# Patient Record
Sex: FEMALE | Race: WHITE | Employment: FULL TIME | URBAN - METROPOLITAN AREA
[De-identification: names, ages, dates, MRNs, and addresses within clinical notes are randomized per-mention and may not be internally consistent; named-entity substitution may affect disease eponyms.]

---

## 2017-02-14 ENCOUNTER — TELEPHONE (OUTPATIENT)
Dept: FAMILY MEDICINE CLINIC | Age: 47
End: 2017-02-14

## 2017-02-20 ENCOUNTER — OFFICE VISIT (OUTPATIENT)
Dept: FAMILY MEDICINE CLINIC | Age: 47
End: 2017-02-20

## 2017-02-20 VITALS
HEART RATE: 78 BPM | DIASTOLIC BLOOD PRESSURE: 88 MMHG | WEIGHT: 158 LBS | HEIGHT: 65 IN | SYSTOLIC BLOOD PRESSURE: 138 MMHG | OXYGEN SATURATION: 100 % | TEMPERATURE: 98.5 F | RESPIRATION RATE: 16 BRPM | BODY MASS INDEX: 26.33 KG/M2

## 2017-02-20 DIAGNOSIS — J06.9 UPPER RESPIRATORY TRACT INFECTION, UNSPECIFIED TYPE: ICD-10-CM

## 2017-02-20 DIAGNOSIS — E66.3 OVERWEIGHT (BMI 25.0-29.9): ICD-10-CM

## 2017-02-20 DIAGNOSIS — I10 ESSENTIAL HYPERTENSION: Primary | ICD-10-CM

## 2017-02-20 RX ORDER — TOPIRAMATE 25 MG/1
25 TABLET ORAL
Qty: 30 TAB | Refills: 5 | Status: SHIPPED | OUTPATIENT
Start: 2017-02-20

## 2017-02-20 RX ORDER — HYDROCHLOROTHIAZIDE 25 MG/1
25 TABLET ORAL DAILY
Qty: 30 TAB | Refills: 5 | Status: SHIPPED | OUTPATIENT
Start: 2017-02-20

## 2017-02-20 RX ORDER — AZITHROMYCIN 250 MG/1
TABLET, FILM COATED ORAL
Qty: 6 TAB | Refills: 0 | Status: SHIPPED | OUTPATIENT
Start: 2017-02-20 | End: 2017-02-25

## 2017-02-20 NOTE — MR AVS SNAPSHOT
Visit Information Date & Time Provider Department Dept. Phone Encounter #  
 2/20/2017 12:00 PM Yelena Peng MD [facility] [phone] [id number] Follow-up Instructions Return in about 2 months (around 4/20/2017) for hypertension follow up. Upcoming Health Maintenance Date Due  
 PAP AKA CERVICAL CYTOLOGY 10/7/2018 DTaP/Tdap/Td series (3 - Td) 5/4/2025 Allergies as of 2/20/2017  Review Complete On: 2/20/2017 By: Yelena Peng MD  
  
 Severity Noted Reaction Type Reactions Latex  11/19/2012    Hives, Itching Anaprox [Naproxen Sodium]  05/24/2010    Nausea and Vomiting Codeine  06/01/2011    Itching Demerol [Meperidine]  05/24/2010    Nausea and Vomiting Erythromycin  03/10/2011    Nausea and Vomiting Current Immunizations  Reviewed on 5/4/2015 Name Date Hep B Vaccine (Adult) 4/15/2016 Influenza Vaccine (Quad) PF 11/21/2016 TDAP Vaccine 8/31/2010 Td, Adsorbed PF 5/4/2015 10:00 AM  
  
 Not reviewed this visit You Were Diagnosed With   
  
 Codes Comments Essential hypertension    -  Primary ICD-10-CM: I10 
ICD-9-CM: 401.9 Upper respiratory tract infection, unspecified type     ICD-10-CM: J06.9 ICD-9-CM: 465.9 Overweight (BMI 25.0-29. 9)     ICD-10-CM: W99.7 ICD-9-CM: 278.02 Vitals BP Pulse Temp Resp Height(growth percentile) Weight(growth percentile) 138/88 78 98.5 °F (36.9 °C) (Oral) 16 5' 4.5\" (1.638 m) 158 lb (71.7 kg) LMP SpO2 BMI OB Status Smoking Status 11/12/2012 100% 26.7 kg/m2 Hysterectomy Never Smoker Vitals History BMI and BSA Data Body Mass Index Body Surface Area  
 26.7 kg/m 2 1.81 m 2 Preferred Pharmacy Pharmacy Name Phone [facility] [address] [phone] Your Updated Medication List  
  
   
This list is accurate as of: 2/20/17 12:53 PM.  Always use your most recent med list.  
  
  
 azithromycin 250 mg tablet Commonly known as:  [place] Potter Take 2 tablets today, then take 1 tablet daily  
  
 hydroCHLOROthiazide 25 mg tablet Commonly known as:  HYDRODIURIL Take 1 Tab by mouth daily. topiramate 25 mg tablet Commonly known as:  TOPAMAX Take 1 Tab by mouth daily (with breakfast). Prescriptions Sent to Pharmacy Refills  
 hydroCHLOROthiazide (HYDRODIURIL) 25 mg tablet 5 Sig: Take 1 Tab by mouth daily. Class: Normal  
 Pharmacy: [facility] [address] [place] [address] Ph #: [phone] Route: Oral  
 topiramate (TOPAMAX) 25 mg tablet 5 Sig: Take 1 Tab by mouth daily (with breakfast). Class: Normal  
 Pharmacy: [facility] [address] [place] [address] Ph #: [phone] Route: Oral  
 azithromycin (ZITHROMAX) 250 mg tablet 0 Sig: Take 2 tablets today, then take 1 tablet daily Class: Normal  
 Pharmacy: [facility] [address] [place] [address] Ph #: [phone] Follow-up Instructions Return in about 2 months (around 4/20/2017) for hypertension follow up. Introducing [facility]! Dear Leodan Valenzuela: 
Thank you for requesting a Multiplicom account. Our records indicate that you already have an active Multiplicom account. You can access your account anytime at [email or web address] Did you know that you can access your hospital and ER discharge instructions at any time in Multiplicom? You can also review all of your test results from your hospital stay or ER visit. Additional Information If you have questions, please visit the Frequently Asked Questions section of the Multiplicom website at [email or web address] Remember, Multiplicom is NOT to be used for urgent needs. For medical emergencies, dial 911. Now available from your iPhone and Android! Please provide this summary of care documentation to your next provider. Your primary care clinician is listed as Prerna Aleman. If you have any questions after today's visit, please call [phone].

## 2017-02-20 NOTE — PROGRESS NOTES
Chief Complaint   Patient presents with    Hypertension       1. Have you been to the ER, urgent care clinic since your last visit? Hospitalized since your last visit? No    2. Have you seen or consulted any other health care providers outside of the [address] [place] [address] since your last visit? Include any pap smears or colon screening. No    Body mass index is 26.7 kg/(m^2).

## 2017-02-20 NOTE — PROGRESS NOTES
HISTORY OF PRESENT ILLNESS  Kurt Magdaleno is a 51 y.o. female. Blood pressure 153/87, pulse 78, temperature 98.5 °F (36.9 °C), temperature source Oral, resp. rate 16, height 5' 4.5\" (1.638 m), weight 158 lb (71.7 kg), last menstrual period 11/12/2012, SpO2 100 %. Body mass index is 26.7 kg/(m^2). Chief Complaint   Patient presents with    Hypertension      HPI   Kurt Magdaleno 51 y.o. female  presents to the office today for a follow up on hypertension. Hypertension: Bp at office today 153/87, 138/88 with manual arm cuff recheck. Pt notes she has been off HCTZ 25 mg daily for one month since she no longer has insurance. Bp is not at goal. I have advised pt to restart HCTZ 25 mg daily for improvement in bp. Discussed with pt to apply for a [employer] to help with her insurance issues. We will reassess bp in two months. Cold symptoms: Pt notes onset of cold symptoms since yesterday morning. She notes onset of sneezing, congestion with thick green mucus, cough, maxillary sinus pressure, and intermittent chills. Likely URI. Advised pt to start Azithromycin. Pt to notify me if symptoms progress or fail to improve. Weight management: Pt has gained about 22 lbs since last visit on 11/21/16. She notes she is watching her diet and exercising regularly so she does not know why she is gaining weight. Advised pt to start Topamax 25 mg daily. Reviewed with pt on benefit/risk/side effects of this medication. We will reassess in two months. Current Outpatient Prescriptions   Medication Sig Dispense Refill    hydroCHLOROthiazide (HYDRODIURIL) 25 mg tablet Take 1 Tab by mouth daily.  30 Tab 5     Allergies   Allergen Reactions    Latex Hives and Itching    Anaprox [Naproxen Sodium] Nausea and Vomiting    Codeine Itching    Demerol [Meperidine] Nausea and Vomiting    Erythromycin Nausea and Vomiting     Past Medical History   Diagnosis Date    Adopted      NO HISTORY KNOWN    Anxiety 5/24/2010    Hypertension     Low back pain     Motion sickness     Nausea & vomiting      Past Surgical History   Procedure Laterality Date    Hx appendectomy      Hx fracture tx Right 10/17/15     Right middle finger    Hx tubal ligation      Hx gyn       ovarian cyst removed    Hx hysterectomy      Hx tonsillectomy      Hx adenoidectomy      Hx orthopaedic       rt foot and rt wrist surgery    Hx orthopaedic       tendon replacement right foot    Hx acl reconstruction  12/20/13     Dr. Trisha Yañez at [facility] [address] Hx acl reconstruction Left 11/18/15     [facility]    Hx orthopaedic       left kne surgery 09/06/16     Family History   Problem Relation Age of Onset    Cancer Mother      BRCA ( adopted)    Cancer Father      lymphoma, B cell (adopted)     Social History   Substance Use Topics    Smoking status: Never Smoker    Smokeless tobacco: Never Used    Alcohol use Yes      Comment: OCC. Review of Systems   Constitutional: Positive for chills (intermittent). Negative for malaise/fatigue. HENT: Positive for congestion. + maxillary sinus pressure   Eyes: Negative for blurred vision. Respiratory: Positive for cough. Negative for shortness of breath. Cardiovascular: Negative for chest pain and leg swelling. Musculoskeletal: Negative. Neurological: Negative. Negative for dizziness and headaches. All other systems reviewed and are negative. Physical Exam   Constitutional: She is oriented to person, place, and time. She appears well-developed and well-nourished. No distress. HENT:   Head: Normocephalic and atraumatic. Neck: Carotid bruit is not present. Cardiovascular: Normal rate, regular rhythm, normal heart sounds and intact distal pulses. Exam reveals no gallop and no friction rub. No murmur heard. Pulmonary/Chest: Effort normal and breath sounds normal. No respiratory distress. She has no wheezes. She has no rales.    Musculoskeletal: She exhibits no edema. Neurological: She is alert and oriented to person, place, and time. Skin: She is not diaphoretic. Psychiatric: She has a normal mood and affect. Her behavior is normal. Judgment and thought content normal.   Nursing note and vitals reviewed. ASSESSMENT and PLAN  Soy Young was seen today for hypertension. Diagnoses and all orders for this visit:    Essential hypertension  -     hydroCHLOROthiazide (HYDRODIURIL) 25 mg tablet; Take 1 Tab by mouth daily.  - Pt has been off HCTZ for past one month due to insurance issues. I have advised pt to restart the medication and we will reassess bp in two months. Upper respiratory tract infection, unspecified type  -     azithromycin (ZITHROMAX) 250 mg tablet; Take 2 tablets today, then take 1 tablet daily  - Advised pt to start Zithromax. Pt to notify me if symptoms progress or fail to improve. Overweight (BMI 25.0-29.9)  -     topiramate (TOPAMAX) 25 mg tablet; Take 1 Tab by mouth daily (with breakfast). - I have reviewed/discussed the above normal BMI with the patient. I have recommended the following interventions: dietary management education, guidance, and counseling, dietary needs education, encourage exercise, feeding regime, lifestyle education regarding diet and monitor weight . The plan is as follows: I have counseled this patient on diet and exercise regimens  start Topamax 25 mg daily. .        Follow-up Disposition:  Return in about 2 months (around 4/20/2017) for hypertension follow up. Medication risks/benefits/costs/interactions/alternatives discussed with patient. Advised patient to call back or return to office if symptoms worsen/change/persist.  If patient cannot reach us or should anything more severe/urgent arise he/she should proceed directly to the nearest emergency department. Discussed expected course/resolution/complications of diagnosis in detail with patient.   Patient given a written after visit summary which includes her diagnoses, current medications and vitals. Patient expressed understanding with the diagnosis and plan. Written by rhoda Rabago, as dictated by Macario Jones M.D.    I have reviewed and agree with the above note and have made corrections where appropriate, Dr. Brandt Day MD

## 2017-02-20 NOTE — LETTER
NOTIFICATION RETURN TO WORK / SCHOOL 
 
2/20/2017 1:02 PM 
 
Ms. Gualberto Omer [address] To Whom It May Concern: 
 
Gualberto Omer is currently under the care of LU[facility] FERNANDO Eugene. She will return to work/school on: 02/22/2017 If there are questions or concerns please have the patient contact our office. Sincerely, Nadiya Ocampo MD

## 2017-02-22 ENCOUNTER — TELEPHONE (OUTPATIENT)
Dept: FAMILY MEDICINE CLINIC | Age: 47
End: 2017-02-22

## 2017-02-22 DIAGNOSIS — R05.9 COUGH: Primary | ICD-10-CM

## 2017-02-22 RX ORDER — BENZONATATE 200 MG/1
200 CAPSULE ORAL
Qty: 21 CAP | Refills: 0 | Status: SHIPPED | OUTPATIENT
Start: 2017-02-22 | End: 2017-03-01

## 2017-02-22 NOTE — TELEPHONE ENCOUNTER
Contact # is [phone]    Patient states that she still is not getting better, but is in fact getting worse since Monday, 2/20/17. Her whole body aches, throat is sore, & her ears ache.  Please advise

## 2017-02-22 NOTE — TELEPHONE ENCOUNTER
Called and spoke to patient, adv that the cold may be at its peak, to take OTC pain medication and speak with [facility] regarding OTC cough syrup as well as possibility of pain meds/combined with PSE. Requested Prescriptions     Signed Prescriptions Disp Refills    benzonatate (TESSALON) 200 mg capsule 21 Cap 0     Sig: Take 1 Cap by mouth three (3) times daily as needed for Cough for up to 7 days. Authorizing Provider: Ynes Welch     Ordering User: Kyler LIZ 38, verbal order received. Patient/ Caller given an opportunity to ask questions, repeated information, and verbalized understanding.

## 2018-10-24 ENCOUNTER — HOSPITAL ENCOUNTER (EMERGENCY)
Age: 48
Discharge: HOME | End: 2018-10-24
Attending: EMERGENCY MEDICINE
Payer: COMMERCIAL

## 2018-10-24 VITALS
OXYGEN SATURATION: 99 % | SYSTOLIC BLOOD PRESSURE: 137 MMHG | HEART RATE: 84 BPM | BODY MASS INDEX: 25.19 KG/M2 | TEMPERATURE: 98.5 F | DIASTOLIC BLOOD PRESSURE: 84 MMHG | WEIGHT: 149.03 LBS | RESPIRATION RATE: 14 BRPM

## 2018-10-24 DIAGNOSIS — S61.216A LACERATION OF RIGHT LITTLE FINGER WITHOUT FOREIGN BODY WITHOUT DAMAGE TO NAIL, INITIAL ENCOUNTER: Primary | ICD-10-CM

## 2018-10-24 PROCEDURE — 74011250637 HC RX REV CODE- 250/637: Performed by: EMERGENCY MEDICINE

## 2018-10-24 PROCEDURE — 90715 TDAP VACCINE 7 YRS/> IM: CPT | Performed by: EMERGENCY MEDICINE

## 2018-10-24 PROCEDURE — 77030018846 HC SOL IRR STRL H20 ICUM -A

## 2018-10-24 PROCEDURE — 90471 IMMUNIZATION ADMIN: CPT

## 2018-10-24 PROCEDURE — 74011250636 HC RX REV CODE- 250/636: Performed by: EMERGENCY MEDICINE

## 2018-10-24 PROCEDURE — 99282 EMERGENCY DEPT VISIT SF MDM: CPT

## 2018-10-24 RX ORDER — CEPHALEXIN 500 MG/1
500 CAPSULE ORAL 3 TIMES DAILY
Qty: 15 CAP | Refills: 0 | Status: SHIPPED | OUTPATIENT
Start: 2018-10-25 | End: 2018-10-30

## 2018-10-24 RX ORDER — CEPHALEXIN 250 MG/1
500 CAPSULE ORAL
Status: COMPLETED | OUTPATIENT
Start: 2018-10-24 | End: 2018-10-24

## 2018-10-24 RX ORDER — CEPHALEXIN 500 MG/1
500 CAPSULE ORAL 3 TIMES DAILY
Qty: 15 CAP | Refills: 0 | Status: SHIPPED | OUTPATIENT
Start: 2018-10-25 | End: 2018-10-24

## 2018-10-24 RX ADMIN — TETANUS TOXOID, REDUCED DIPHTHERIA TOXOID AND ACELLULAR PERTUSSIS VACCINE, ADSORBED 0.5 ML: 5; 2.5; 8; 8; 2.5 SUSPENSION INTRAMUSCULAR at 17:54

## 2018-10-24 RX ADMIN — CEPHALEXIN 500 MG: 250 CAPSULE ORAL at 18:00

## 2018-10-24 RX ADMIN — BACITRACIN ZINC, NEOMYCIN SULFATE, POLYMYXIN B SULFATE 1 PACKET: 3.5; 5000; 4 OINTMENT TOPICAL at 18:00

## 2018-10-24 NOTE — DISCHARGE INSTRUCTIONS
Cuts Left Open: Care Instructions  Your Care Instructions    A cut can happen anywhere on your body. Sometimes a cut can injure the tendons, blood vessels, or nerves. A cut may be left open instead of being closed with stitches, staples, or adhesive. A cut may be left open when it is likely to become infected, because closing it can make infection even more likely. You will probably have a bandage. The doctor may want the cut to stay open the whole time it heals. This happens with some cuts when too much time has gone by since the cut happened. Or the doctor may tell you to come back to have the cut closed in 4 to 5 days, when there is less chance of infection. If the cut stays open while healing, your scar may be larger than if the cut was closed. But you can get treatment later to make the scar smaller. The doctor has checked you carefully, but problems can develop later. If you notice any problems or new symptoms, get medical treatment right away. Follow-up care is a key part of your treatment and safety. Be sure to make and go to all appointments, and call your doctor if you are having problems. It's also a good idea to know your test results and keep a list of the medicines you take. How can you care for yourself at home? · Keep the cut dry for the first 24 to 48 hours. After this, you can shower if your doctor okays it. Pat the cut dry. · Don't soak the cut, such as in a bathtub. Your doctor will tell you when it's safe to get the cut wet. · If your doctor told you how to care for your cut, follow your doctor's instructions. If you did not get instructions, follow this general advice:  ? After the first 24 to 48 hours, wash the cut with clean water 2 times a day. Don't use hydrogen peroxide or alcohol, which can slow healing. ? You may cover the cut with a thin layer of petroleum jelly, such as Vaseline, and a nonstick bandage. ?  Apply more petroleum jelly and replace the bandage as needed. · Prop up the injured area on a pillow anytime you sit or lie down during the next 3 days. Try to keep it above the level of your heart. This will help reduce swelling. · Avoid any activity that could cause your cut to get worse. · Take pain medicines exactly as directed. ? If the doctor gave you a prescription medicine for pain, take it as prescribed. ? If you are not taking a prescription pain medicine, ask your doctor if you can take an over-the-counter medicine. When should you call for help? Call your doctor now or seek immediate medical care if:    · You have new pain, or your pain gets worse.     · The cut starts to bleed, and blood soaks through the bandage. Oozing small amounts of blood is normal.     · The skin near the cut is cold or pale or changes color.     · You have tingling, weakness, or numbness near the cut.     · You have trouble moving the area near the cut.     · You have symptoms of infection, such as:  ? Increased pain, swelling, warmth, or redness around the cut.  ? Red streaks leading from the cut.  ? Pus draining from the cut.  ? A fever.    Watch closely for changes in your health, and be sure to contact your doctor if:    · The cut is not closing (getting smaller).     · You do not get better as expected. Where can you learn more? Go to http://ananda-scarlet.info/. Enter [id number] in the search box to learn more about \"Cuts Left Open: Care Instructions. \"  Current as of: November 20, 2017  Content Version: 11.8  © 4112-0716 [employer]wise[employer]. Care instructions adapted under license by [employer] (which disclaims liability or warranty for this information). If you have questions about a medical condition or this instruction, always ask your healthcare professional. [place] [address] any warranty or liability for your use of this information.
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
## 2018-10-24 NOTE — ED NOTES
The patient was discharged home by Dr Evelyn Verma in stable condition. The patient is alert and oriented, in no respiratory distress. The patient's diagnosis, condition and treatment were explained. The patient expressed understanding. A discharge plan has been developed. A  was not involved in the process. Aftercare instructions were given. Pt ambulatory out of the ED with family.

## 2018-10-24 NOTE — ED PROVIDER NOTES
The history is provided by the patient. Laceration The incident occurred less than 1 hour ago. Pain location: right pinky finger. The laceration is 1 cm in size. The injury mechanism is broken glass. Foreign body present: no. It is unknown when the patient last had a tetanus shot. Past Medical History:  
Diagnosis Date  Adopted NO HISTORY KNOWN  
 Anxiety 5/24/2010  Hypertension  Low back pain  Motion sickness  Nausea & vomiting Past Surgical History:  
Procedure Laterality Date  HX ACL RECONSTRUCTION  12/20/13 Dr. Nan Callaway at [address] [facility] Left 11/18/15  
 [facility]  
 HX ADENOIDECTOMY  HX APPENDECTOMY  HX FRACTURE TX Right 10/17/15 Right middle finger  HX GYN    
 ovarian cyst removed  HX HYSTERECTOMY  HX ORTHOPAEDIC    
 rt foot and rt wrist surgery  HX ORTHOPAEDIC    
 tendon replacement right foot  HX ORTHOPAEDIC    
 left kne surgery 09/06/16  HX TONSILLECTOMY  HX TUBAL LIGATION Family History:  
Problem Relation Age of Onset  Cancer Mother BRCA ( adopted)  Cancer Father   
     lymphoma, B cell (adopted) Social History Socioeconomic History  Marital status: LEGALLY  Spouse name: Not on file  Number of children: Not on file  Years of education: Not on file  Highest education level: Not on file Social Needs  Financial resource strain: Not on file  Food insecurity - worry: Not on file  Food insecurity - inability: Not on file  Transportation needs - medical: Not on file  Transportation needs - non-medical: Not on file Occupational History  Not on file Tobacco Use  Smoking status: Never Smoker  Smokeless tobacco: Never Used Substance and Sexual Activity  Alcohol use: Yes Comment: OCC.  Drug use: No  
 Sexual activity: Yes  
  Partners: Male Other Topics Concern [address] No  
  Blood Transfusions No  
 Caffeine Concern No  
 Occupational Exposure No  
 Hobby Hazards No  
 Sleep Concern No  
 Stress Concern Yes  Weight Concern No  
 Special Diet No  
 Back Care Yes  Exercise Yes  Bike Helmet Yes [address] Yes  Self-Exams No  
Social History Narrative  Not on file ALLERGIES: Latex; Anaprox [naproxen sodium]; Codeine; Demerol [meperidine]; and Erythromycin Review of Systems Musculoskeletal:  
     5th fingertip pain at site of wound Skin: Positive for wound. other than finger pain and laceration she has no complaints Vitals:  
 10/24/18 1740 BP: (!) 144/100 Pulse: 87 Resp: 12 Temp: 98.5 °F (36.9 °C) SpO2: 98% Weight: 67.6 kg (149 lb 0.5 oz) Physical Exam  
Constitutional: She is oriented to person, place, and time. She appears well-developed and well-nourished. Musculoskeletal:  
     Right hand: She exhibits tenderness and laceration. Hands: 
Less than 1cm superficial flap wound, Neurological: She is alert and oriented to person, place, and time. MDM Number of Diagnoses or Management Options Laceration of right little finger without foreign body without damage to nail, initial encounter:  
Diagnosis management comments: Less than 1cm superficial flap wound, no active bleeding after irrigation with tap water - no benefit to sutures. Will have bandaged and use ABX ointment and oral ABX as prophylaxis, update her tetanus Patient agreeable to plan. Procedures

## 2018-10-24 NOTE — ED NOTES
Patient soaked right hand and 5th digit in warm water per doctor's orders. Coated that area with bacitracin and bandaids. Patient tolerated Td vaccine well. No change in \"throbbing\" type pain in finger.

## 2019-10-25 ENCOUNTER — APPOINTMENT (OUTPATIENT)
Dept: CT IMAGING | Age: 49
End: 2019-10-25
Attending: NURSE PRACTITIONER
Payer: COMMERCIAL

## 2019-10-25 ENCOUNTER — HOSPITAL ENCOUNTER (EMERGENCY)
Age: 49
Discharge: HOME | End: 2019-10-26
Attending: EMERGENCY MEDICINE
Payer: COMMERCIAL

## 2019-10-25 VITALS
DIASTOLIC BLOOD PRESSURE: 83 MMHG | HEART RATE: 80 BPM | BODY MASS INDEX: 22.53 KG/M2 | OXYGEN SATURATION: 99 % | RESPIRATION RATE: 16 BRPM | SYSTOLIC BLOOD PRESSURE: 156 MMHG | HEIGHT: 64 IN | TEMPERATURE: 98.4 F | WEIGHT: 132 LBS
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
## 2019-10-25 DIAGNOSIS — M54.6 ACUTE BILATERAL THORACIC BACK PAIN: ICD-10-CM

## 2019-10-25 DIAGNOSIS — M25.512 ACUTE PAIN OF LEFT SHOULDER: ICD-10-CM

## 2019-10-25 DIAGNOSIS — R07.89 CHEST WALL PAIN: ICD-10-CM

## 2019-10-25 DIAGNOSIS — S09.93XA FACIAL INJURY, INITIAL ENCOUNTER: ICD-10-CM

## 2019-10-25 DIAGNOSIS — M54.2 NECK PAIN: ICD-10-CM

## 2019-10-25 DIAGNOSIS — S09.90XA INJURY OF HEAD, INITIAL ENCOUNTER: ICD-10-CM

## 2019-10-25 PROCEDURE — 74011250636 HC RX REV CODE- 250/636: Performed by: NURSE PRACTITIONER

## 2019-10-25 PROCEDURE — 72125 CT NECK SPINE W/O DYE: CPT

## 2019-10-25 PROCEDURE — 71250 CT THORAX DX C-: CPT

## 2019-10-25 PROCEDURE — 70486 CT MAXILLOFACIAL W/O DYE: CPT

## 2019-10-25 PROCEDURE — 70450 CT HEAD/BRAIN W/O DYE: CPT

## 2019-10-25 PROCEDURE — 74011250637 HC RX REV CODE- 250/637: Performed by: NURSE PRACTITIONER

## 2019-10-25 PROCEDURE — 99284 EMERGENCY DEPT VISIT MOD MDM: CPT

## 2019-10-25 PROCEDURE — 96372 THER/PROPH/DIAG INJ SC/IM: CPT

## 2019-10-25 RX ORDER — KETOROLAC TROMETHAMINE 30 MG/ML
30 INJECTION, SOLUTION INTRAMUSCULAR; INTRAVENOUS
Status: COMPLETED | OUTPATIENT
Start: 2019-10-25 | End: 2019-10-25

## 2019-10-25 RX ORDER — OXYCODONE AND ACETAMINOPHEN 5; 325 MG/1; MG/1
1 TABLET ORAL
Status: COMPLETED | OUTPATIENT
Start: 2019-10-25 | End: 2019-10-25

## 2019-10-25 RX ORDER — ONDANSETRON 4 MG/1
4 TABLET, ORALLY DISINTEGRATING ORAL
Status: COMPLETED | OUTPATIENT
Start: 2019-10-25 | End: 2019-10-25

## 2019-10-25 RX ORDER — METHOCARBAMOL 500 MG/1
500 TABLET, FILM COATED ORAL
Status: COMPLETED | OUTPATIENT
Start: 2019-10-25 | End: 2019-10-25

## 2019-10-25 RX ADMIN — ONDANSETRON 4 MG: 4 TABLET, ORALLY DISINTEGRATING ORAL at 22:57

## 2019-10-25 RX ADMIN — KETOROLAC TROMETHAMINE 30 MG: 30 INJECTION, SOLUTION INTRAMUSCULAR at 23:00

## 2019-10-25 RX ADMIN — METHOCARBAMOL TABLETS 500 MG: 500 TABLET, COATED ORAL at 22:58

## 2019-10-25 RX ADMIN — OXYCODONE HYDROCHLORIDE AND ACETAMINOPHEN 1 TABLET: 5; 325 TABLET ORAL at 23:53

## 2019-10-25 NOTE — LETTER
[address] 
[facility] EMERGENCY DEPT 
[address] [facility] [place] [id number] [phone] Work/School Note Date: 10/25/2019 To Whom It May concern: 
 
aMrija Puentes was seen and treated today in the emergency room by the following provider(s): 
Attending Provider: Erendira Ford MD 
Nurse Practitioner: Storm Peñaloza NP. Marija Puentes may return to work on 10/29/2019. Sincerely, Camden Proper, NP

## 2019-10-26 RX ORDER — ONDANSETRON 4 MG/1
4 TABLET, ORALLY DISINTEGRATING ORAL
Qty: 12 TAB | Refills: 0 | Status: SHIPPED | OUTPATIENT
Start: 2019-10-26

## 2019-10-26 RX ORDER — CYCLOBENZAPRINE HCL 10 MG
10 TABLET ORAL
Qty: 12 TAB | Refills: 0 | Status: SHIPPED | OUTPATIENT
Start: 2019-10-26

## 2019-10-26 RX ORDER — IBUPROFEN 800 MG/1
800 TABLET ORAL
Qty: 20 TAB | Refills: 0 | Status: SHIPPED | OUTPATIENT
Start: 2019-10-26 | End: 2019-11-02

## 2019-10-26 RX ORDER — ACETAMINOPHEN 325 MG/1
650 TABLET ORAL
Qty: 20 TAB | Refills: 0 | Status: SHIPPED | OUTPATIENT
Start: 2019-10-26
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
## 2019-12-18 ENCOUNTER — HOSPITAL ENCOUNTER (OUTPATIENT)
Dept: GENERAL RADIOLOGY | Age: 49
Discharge: HOME | End: 2019-12-18
Payer: COMMERCIAL

## 2019-12-18 DIAGNOSIS — M25.512 LEFT SHOULDER PAIN: ICD-10-CM

## 2019-12-18 PROCEDURE — 73030 X-RAY EXAM OF SHOULDER: CPT
